# Patient Record
(demographics unavailable — no encounter records)

---

## 2025-04-17 NOTE — DISCUSSION/SUMMARY
[de-identified] :  The underlying pathophysiology was reviewed with the patient. XR films were reviewed with the patient. Discussed at length the nature of the patients condition. The right shoulder symptoms are secondary to shoulder contusion or fully healed fracture.   Treatment options were discussed including; observation, NSAIDS, analgesics, injection, physical therapy, and surgical intervention.  The patient wishes to proceed with a cortisone injection today. Under sterile precautions, an injection of 5cc 1% lidocaine without epinephrine and 0.5cc Kenalog 40mg, 0.5cc Dexamethasone was administered into the right posterior and anterior subacromial joint space (1/3 respectively). The patient tolerated the procedure well. Rest and apply ice.  Patient was advised to try OTC medication and topical analgesics for further pain management. I recommend that the patient utilize Voltaren gel, Icy Hot, Biofreeze, Bengay, or 4% lidocaine patches. Hot shower with ROM.   Gentle range of motion, stretching, and strengthening exercises were encouraged. Increase activity as tolerated.   All questions answered, understanding verbalized. Patient in agreement with plan of care. The patient can follow-up as needed.  If the patient begins to experience any changes or severe exacerbation of his symptoms, he should reach out to me as soon as possible.

## 2025-04-17 NOTE — ADDENDUM
[FreeTextEntry1] : I, Laurent Fields wrote this note acting as a scribe for Dr. Robbin Taylor on 04/17/2025.   All medical record entries made by the Scribe were at my, Dr. Robbin Taylor MD., direction and personally dictated by me on 04/17/2025. I have personally reviewed the chart and agree that the record accurately reflects my personal performance of the history, physical exam, assessment and plan.

## 2025-04-17 NOTE — PHYSICAL EXAM
[de-identified] : Patient is WDWN, alert, and in no acute distress. Breathing is unlabored. He is grossly oriented to person, place, and time.  He is accompanied by his daughter.  Right Shoulder: Inspection/ Palpation: there is tenderness over the cuff, no swelling, no deformities. Range of Motion: Limited with pain Strength: Limited Stability: no joint instability on provocative testing.  [de-identified] : AP, transcapula, and axillary views of the right shoulder were obtained and revealed no abnormalities. No acute fracture. No dislocation. Cartilage spaces are maintained.

## 2025-04-17 NOTE — PHYSICAL EXAM
[de-identified] : Patient is WDWN, alert, and in no acute distress. Breathing is unlabored. He is grossly oriented to person, place, and time.  He is accompanied by his daughter.  Right Shoulder: Inspection/ Palpation: there is tenderness over the cuff, no swelling, no deformities. Range of Motion: Limited with pain Strength: Limited Stability: no joint instability on provocative testing.  [de-identified] : AP, transcapula, and axillary views of the right shoulder were obtained and revealed no abnormalities. No acute fracture. No dislocation. Cartilage spaces are maintained.

## 2025-04-17 NOTE — HISTORY OF PRESENT ILLNESS
[de-identified] : [place] is a 101 y/o M presenting with right shoulder pain x 2 months. Patient is accompanied by his daughter to assist with history. Patient had sustained a mechanical fall outside of his home and landed on his right shoulder. Patient has not had the shoulder evaluated up until now. He states the pain is on/off, improved with rest and exacerbated with overhead movements or reaching backwards. He states the pain is felt primarily to the anterior and lateral aspect of the shoulder without radiation. He has not been taking any medication for pain relief. He states overall he feels like the pain is slowly improving but has been persistent. He currently denies any bruising, swelling, redness, numbness, tingling.

## 2025-04-17 NOTE — HISTORY OF PRESENT ILLNESS
[de-identified] : [place] is a 101 y/o M presenting with right shoulder pain x 2 months. Patient is accompanied by his daughter to assist with history. Patient had sustained a mechanical fall outside of his home and landed on his right shoulder. Patient has not had the shoulder evaluated up until now. He states the pain is on/off, improved with rest and exacerbated with overhead movements or reaching backwards. He states the pain is felt primarily to the anterior and lateral aspect of the shoulder without radiation. He has not been taking any medication for pain relief. He states overall he feels like the pain is slowly improving but has been persistent. He currently denies any bruising, swelling, redness, numbness, tingling.

## 2025-04-17 NOTE — DISCUSSION/SUMMARY
[de-identified] :  The underlying pathophysiology was reviewed with the patient. XR films were reviewed with the patient. Discussed at length the nature of the patients condition. The right shoulder symptoms are secondary to shoulder contusion or fully healed fracture.   Treatment options were discussed including; observation, NSAIDS, analgesics, injection, physical therapy, and surgical intervention.  The patient wishes to proceed with a cortisone injection today. Under sterile precautions, an injection of 5cc 1% lidocaine without epinephrine and 0.5cc Kenalog 40mg, 0.5cc Dexamethasone was administered into the right posterior and anterior subacromial joint space (1/3 respectively). The patient tolerated the procedure well. Rest and apply ice.  Patient was advised to try OTC medication and topical analgesics for further pain management. I recommend that the patient utilize Voltaren gel, Icy Hot, Biofreeze, Bengay, or 4% lidocaine patches. Hot shower with ROM.   Gentle range of motion, stretching, and strengthening exercises were encouraged. Increase activity as tolerated.   All questions answered, understanding verbalized. Patient in agreement with plan of care. The patient can follow-up as needed.  If the patient begins to experience any changes or severe exacerbation of his symptoms, he should reach out to me as soon as possible.